# Patient Record
Sex: MALE | URBAN - METROPOLITAN AREA
[De-identification: names, ages, dates, MRNs, and addresses within clinical notes are randomized per-mention and may not be internally consistent; named-entity substitution may affect disease eponyms.]

---

## 2017-07-03 ENCOUNTER — IMPORTED ENCOUNTER (OUTPATIENT)
Dept: URBAN - METROPOLITAN AREA CLINIC 1 | Facility: CLINIC | Age: 52
End: 2017-07-03

## 2017-07-03 PROBLEM — H40.023: Noted: 2017-07-03

## 2017-07-03 PROBLEM — H10.45: Noted: 2017-07-03

## 2017-07-03 PROBLEM — H25.13: Noted: 2017-07-03

## 2017-07-03 PROCEDURE — 92083 EXTENDED VISUAL FIELD XM: CPT

## 2017-07-03 PROCEDURE — 92133 CPTRZD OPH DX IMG PST SGM ON: CPT

## 2017-07-03 PROCEDURE — 92012 INTRM OPH EXAM EST PATIENT: CPT

## 2017-07-03 NOTE — PATIENT DISCUSSION
1.  Glaucoma Suspect OU : (CD 0.55/0.75) FM Hx (Mother) AA Risk of cupping. OCT/ VF WNL OU Today. Will observe on no meds. Pt considered High Risk. 2.  Cataract OU: Observe for now without intervention. The patient was advised to contact us if any change or worsening of vision3. Allergic Conjunctivitis OU- pt symptomatic. Begin Zaditor BID OU 4. 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 Return for an appointment in 1 yr 27 OCT with Dr. Farheen Fajardo.

## 2020-06-19 ENCOUNTER — HOSPITAL ENCOUNTER (OUTPATIENT)
Dept: INFUSION THERAPY | Age: 55
Discharge: HOME | End: 2020-06-19
Payer: COMMERCIAL

## 2020-06-19 VITALS
WEIGHT: 195 LBS | RESPIRATION RATE: 18 BRPM | HEIGHT: 71 IN | TEMPERATURE: 97.2 F | SYSTOLIC BLOOD PRESSURE: 114 MMHG | HEART RATE: 60 BPM | DIASTOLIC BLOOD PRESSURE: 74 MMHG | BODY MASS INDEX: 27.3 KG/M2 | OXYGEN SATURATION: 100 %

## 2020-06-19 PROCEDURE — 96360 HYDRATION IV INFUSION INIT: CPT

## 2020-06-19 PROCEDURE — 74011250636 HC RX REV CODE- 250/636: Performed by: NURSE PRACTITIONER

## 2020-06-19 RX ORDER — METOPROLOL TARTRATE 25 MG/1
37.5 TABLET, FILM COATED ORAL 2 TIMES DAILY
COMMUNITY
End: 2022-06-17

## 2020-06-19 RX ORDER — AMLODIPINE BESYLATE 5 MG/1
5 TABLET ORAL DAILY
COMMUNITY

## 2020-06-19 RX ORDER — VALGANCICLOVIR 450 MG/1
450 TABLET, FILM COATED ORAL 2 TIMES DAILY
COMMUNITY
End: 2022-06-17

## 2020-06-19 RX ORDER — ACETAMINOPHEN 500 MG
325 TABLET ORAL
COMMUNITY

## 2020-06-19 RX ORDER — PANTOPRAZOLE SODIUM 40 MG/1
40 TABLET, DELAYED RELEASE ORAL DAILY
COMMUNITY

## 2020-06-19 RX ORDER — TACROLIMUS 1 MG/1
4 CAPSULE ORAL 2 TIMES DAILY
COMMUNITY

## 2020-06-19 RX ORDER — ASPIRIN 81 MG/1
81 TABLET ORAL DAILY
COMMUNITY
End: 2022-06-17

## 2020-06-19 RX ORDER — AMOXICILLIN 250 MG
2 CAPSULE ORAL
COMMUNITY

## 2020-06-19 RX ORDER — BISMUTH SUBSALICYLATE 262 MG
1 TABLET,CHEWABLE ORAL DAILY
COMMUNITY

## 2020-06-19 RX ORDER — ATOVAQUONE 750 MG/5ML
1500 SUSPENSION ORAL DAILY
COMMUNITY

## 2020-06-19 RX ORDER — METOCLOPRAMIDE 5 MG/1
5 TABLET ORAL
COMMUNITY
End: 2022-06-17

## 2020-06-19 RX ORDER — PREDNISONE 2.5 MG/1
10 TABLET ORAL DAILY
COMMUNITY

## 2020-06-19 RX ORDER — SODIUM CHLORIDE 0.9 % (FLUSH) 0.9 %
10-40 SYRINGE (ML) INJECTION AS NEEDED
Status: DISCONTINUED | OUTPATIENT
Start: 2020-06-19 | End: 2020-06-23 | Stop reason: HOSPADM

## 2020-06-19 RX ORDER — DOCUSATE SODIUM 100 MG/1
100 CAPSULE, LIQUID FILLED ORAL DAILY
COMMUNITY

## 2020-06-19 RX ORDER — MYCOPHENOLATE MOFETIL 500 MG/1
500 TABLET ORAL 2 TIMES DAILY
COMMUNITY
End: 2022-06-17

## 2020-06-19 RX ORDER — NYSTATIN 100000 [USP'U]/ML
1 SUSPENSION ORAL 4 TIMES DAILY
COMMUNITY

## 2020-06-19 RX ORDER — SODIUM CHLORIDE 9 MG/ML
999 INJECTION, SOLUTION INTRAVENOUS CONTINUOUS
Status: DISPENSED | OUTPATIENT
Start: 2020-06-19 | End: 2020-06-19

## 2020-06-19 RX ORDER — DABIGATRAN ETEXILATE 150 MG/1
150 CAPSULE ORAL 2 TIMES DAILY
COMMUNITY
End: 2022-06-17

## 2020-06-19 RX ORDER — MELATONIN
1000 DAILY
COMMUNITY
End: 2022-06-17

## 2020-06-19 RX ORDER — FLUTICASONE PROPIONATE 50 MCG
2 SPRAY, SUSPENSION (ML) NASAL DAILY
COMMUNITY

## 2020-06-19 RX ADMIN — SODIUM CHLORIDE 999 ML/HR: 900 INJECTION, SOLUTION INTRAVENOUS at 09:45

## 2020-06-19 RX ADMIN — Medication 10 ML: at 09:40

## 2020-06-19 NOTE — PROGRESS NOTES
[facility] Progress Note    Date: 2020    Name: Marie Shin    MRN: [id number]         : 1965     Hydration one time      Mr. Estrella Pedroza was assessed and education was provided. Patient is s/p bilateral lune transplant in 2020, due to sarcadosis. Estelle Patrick He states that he is feeling great, denies difficulty breathing. Mr. Martha Calloway vitals were reviewed and patient was observed for 5 minutes prior to treatment. Visit Vitals  /74 (BP 1 Location: Left arm, BP Patient Position: Sitting)   Pulse 60   Temp 97.2 °F (36.2 °C)   Resp 18   Ht 5' 11\" (1.803 m)   Wt 88.5 kg (195 lb)   SpO2 100%   BMI 27.20 kg/m²     Patient Vitals for the past 12 hrs:   Temp Pulse Resp BP SpO2   20 1049 -- 60 18 114/74 --   20 0922 97.2 °F (36.2 °C) 70 18 111/69 100 %       Lab results were obtained and reviewed. No results found for this or any previous visit (from the past 12 hour(s)). IV started in left forearm by Paulette Elena RN w/o difficulty. Good blood return obtained, followed with 10 ml NS flush. 1000 ml Normal Saline was infused at 999 ml/hr. IV removed, no irritation or dranage noted at site, gauze and Coban applied. Mr. Estrella Pedroza tolerated the infusion, and had no complaints. Patient armband removed and shredded. Mr. Estrella Pedroza was discharged from [place] [address] in stable condition at 1050. He has no appointment to return to [place], but follows up with PMD in two weeks for his next appointment.     Yuvonne Opitz, RN  2020  11:49 AM

## 2022-04-02 ASSESSMENT — TONOMETRY
OS_IOP_MMHG: 20
OD_IOP_MMHG: 21

## 2022-04-02 ASSESSMENT — VISUAL ACUITY
OS_CC: 20/20
OD_CC: 20/25

## 2022-06-17 ENCOUNTER — HOSPITAL ENCOUNTER (OUTPATIENT)
Dept: INFUSION THERAPY | Age: 57
Discharge: HOME | End: 2022-06-17
Payer: MEDICARE

## 2022-06-17 VITALS
TEMPERATURE: 97.9 F | SYSTOLIC BLOOD PRESSURE: 111 MMHG | OXYGEN SATURATION: 99 % | DIASTOLIC BLOOD PRESSURE: 72 MMHG | HEART RATE: 73 BPM | RESPIRATION RATE: 16 BRPM

## 2022-06-17 PROCEDURE — 74011000250 HC RX REV CODE- 250: Performed by: INTERNAL MEDICINE

## 2022-06-17 PROCEDURE — 96360 HYDRATION IV INFUSION INIT: CPT

## 2022-06-17 PROCEDURE — 96361 HYDRATE IV INFUSION ADD-ON: CPT

## 2022-06-17 PROCEDURE — 74011250636 HC RX REV CODE- 250/636: Performed by: NURSE PRACTITIONER

## 2022-06-17 RX ORDER — FEBUXOSTAT 80 MG/1
80 TABLET, FILM COATED ORAL DAILY
COMMUNITY

## 2022-06-17 RX ORDER — SODIUM CHLORIDE 9 MG/ML
1000 INJECTION, SOLUTION INTRAVENOUS ONCE
Status: COMPLETED | OUTPATIENT
Start: 2022-06-17 | End: 2022-06-17

## 2022-06-17 RX ORDER — GUAIFENESIN 100 MG/5ML
81 LIQUID (ML) ORAL DAILY
COMMUNITY

## 2022-06-17 RX ORDER — PRAVASTATIN SODIUM 20 MG/1
20 TABLET ORAL DAILY
COMMUNITY

## 2022-06-17 RX ORDER — SODIUM CHLORIDE 0.9 % (FLUSH) 0.9 %
10-40 SYRINGE (ML) INJECTION AS NEEDED
Status: DISCONTINUED | OUTPATIENT
Start: 2022-06-17 | End: 2022-06-21 | Stop reason: HOSPADM

## 2022-06-17 RX ORDER — ALBUTEROL SULFATE 0.83 MG/ML
2.5 SOLUTION RESPIRATORY (INHALATION)
COMMUNITY

## 2022-06-17 RX ORDER — ACYCLOVIR 400 MG/1
400 TABLET ORAL 2 TIMES DAILY
COMMUNITY

## 2022-06-17 RX ORDER — AMPHOTERICIN B 50 MG/10ML
INJECTION, POWDER, LYOPHILIZED, FOR SOLUTION INTRAVENOUS
COMMUNITY

## 2022-06-17 RX ORDER — AZITHROMYCIN 250 MG/1
250 TABLET, FILM COATED ORAL
COMMUNITY

## 2022-06-17 RX ORDER — ONDANSETRON 4 MG/1
4 TABLET, FILM COATED ORAL
COMMUNITY

## 2022-06-17 RX ADMIN — SODIUM CHLORIDE, PRESERVATIVE FREE 10 ML: 5 INJECTION INTRAVENOUS at 10:38

## 2022-06-17 RX ADMIN — SODIUM CHLORIDE 1000 ML: 9 INJECTION, SOLUTION INTRAVENOUS at 10:38

## 2022-06-17 NOTE — PROGRESS NOTES
[facility] Progress Note    Date: 2022    Name: Howie Amaya    MRN: [id number]         : 1965       HYDRATION      Mr. Ying Bay arrived to [facility] at 1000. Mr. Ying Bay was assessed and education was provided. Mr. Whitney Makedavid vitals were reviewed. Visit Vitals  /72 (BP 1 Location: Right upper arm, BP Patient Position: Sitting)   Pulse 73   Temp 97.9 °F (36.6 °C)   Resp 16   SpO2 99%       22g IV inserted in patient's R hand x1 attempt by non-primary nurse (after failed attempt x1 in pt's L anterior forearm by primary nurse). Positive for blood return/ flushes without difficulty. NS 1000ml administered as ordered. Mr. Ying Bay tolerated well without complaints. Discharge/ follow-up instructions discussed w/ pt. Pt verbalized understanding. IV removed/ intact. Gauze/ coban to site. Pt armband removed & shredded. Mr. Ying Bay was discharged from [place] [address] in stable condition at 1220. He has no future appointments scheduled w/ [facility] at this time.     Justice Figueroa RN  2022

## 2024-11-14 ENCOUNTER — NEW PATIENT (OUTPATIENT)
Dept: URBAN - METROPOLITAN AREA CLINIC 1 | Facility: CLINIC | Age: 59
End: 2024-11-14

## 2024-11-14 DIAGNOSIS — H40.023: ICD-10-CM

## 2024-11-14 DIAGNOSIS — H25.813: ICD-10-CM

## 2024-11-14 DIAGNOSIS — H10.45: ICD-10-CM

## 2024-11-14 PROCEDURE — 92133 CPTRZD OPH DX IMG PST SGM ON: CPT

## 2024-11-14 PROCEDURE — 99204 OFFICE O/P NEW MOD 45 MIN: CPT

## 2024-11-14 PROCEDURE — 92015 DETERMINE REFRACTIVE STATE: CPT

## 2024-12-17 ENCOUNTER — PRE-OP/H&P (OUTPATIENT)
Age: 59
End: 2024-12-17

## 2024-12-17 VITALS
SYSTOLIC BLOOD PRESSURE: 115 MMHG | DIASTOLIC BLOOD PRESSURE: 72 MMHG | BODY MASS INDEX: 27.3 KG/M2 | HEIGHT: 71 IN | WEIGHT: 195 LBS | HEART RATE: 72 BPM

## 2024-12-17 DIAGNOSIS — H25.813: ICD-10-CM

## 2024-12-17 PROCEDURE — 92136 OPHTHALMIC BIOMETRY: CPT

## 2024-12-17 PROCEDURE — 92025 CPTRIZED CORNEAL TOPOGRAPHY: CPT | Mod: NC

## 2024-12-17 PROCEDURE — 99499 UNLISTED E&M SERVICE: CPT

## 2025-01-15 ENCOUNTER — SURGERY/PROCEDURE (OUTPATIENT)
Age: 60
End: 2025-01-15

## 2025-01-15 DIAGNOSIS — H25.812: ICD-10-CM

## 2025-01-15 PROCEDURE — 66984 XCAPSL CTRC RMVL W/O ECP: CPT

## 2025-01-16 ENCOUNTER — POST-OP (OUTPATIENT)
Age: 60
End: 2025-01-16

## 2025-01-16 VITALS
HEIGHT: 71 IN | SYSTOLIC BLOOD PRESSURE: 115 MMHG | DIASTOLIC BLOOD PRESSURE: 72 MMHG | WEIGHT: 195 LBS | BODY MASS INDEX: 27.3 KG/M2 | HEART RATE: 72 BPM

## 2025-01-16 DIAGNOSIS — Z96.1: ICD-10-CM

## 2025-01-16 DIAGNOSIS — H25.811: ICD-10-CM

## 2025-01-16 PROCEDURE — 92136 OPHTHALMIC BIOMETRY: CPT | Mod: 26

## 2025-01-16 PROCEDURE — 92025 CPTRIZED CORNEAL TOPOGRAPHY: CPT | Mod: NC

## 2025-01-29 ENCOUNTER — SURGERY/PROCEDURE (OUTPATIENT)
Age: 60
End: 2025-01-29

## 2025-01-29 DIAGNOSIS — H25.811: ICD-10-CM

## 2025-01-29 PROCEDURE — 66984 XCAPSL CTRC RMVL W/O ECP: CPT | Mod: 79,RT

## 2025-01-30 ENCOUNTER — POST-OP (OUTPATIENT)
Age: 60
End: 2025-01-30

## 2025-01-30 DIAGNOSIS — Z96.1: ICD-10-CM

## 2025-02-28 ENCOUNTER — POST-OP (OUTPATIENT)
Age: 60
End: 2025-02-28

## 2025-02-28 DIAGNOSIS — Z96.1: ICD-10-CM
